# Patient Record
Sex: FEMALE | Race: WHITE
[De-identification: names, ages, dates, MRNs, and addresses within clinical notes are randomized per-mention and may not be internally consistent; named-entity substitution may affect disease eponyms.]

---

## 2020-02-23 ENCOUNTER — HEALTH MAINTENANCE LETTER (OUTPATIENT)
Age: 75
End: 2020-02-23

## 2020-12-06 ENCOUNTER — HEALTH MAINTENANCE LETTER (OUTPATIENT)
Age: 75
End: 2020-12-06

## 2021-04-11 ENCOUNTER — HEALTH MAINTENANCE LETTER (OUTPATIENT)
Age: 76
End: 2021-04-11

## 2021-09-26 ENCOUNTER — HEALTH MAINTENANCE LETTER (OUTPATIENT)
Age: 76
End: 2021-09-26

## 2022-05-07 ENCOUNTER — HEALTH MAINTENANCE LETTER (OUTPATIENT)
Age: 77
End: 2022-05-07

## 2023-04-23 ENCOUNTER — HEALTH MAINTENANCE LETTER (OUTPATIENT)
Age: 78
End: 2023-04-23

## 2023-06-02 ENCOUNTER — HEALTH MAINTENANCE LETTER (OUTPATIENT)
Age: 78
End: 2023-06-02

## 2025-06-14 ENCOUNTER — OFFICE VISIT (OUTPATIENT)
Dept: URGENT CARE | Facility: URGENT CARE | Age: 80
End: 2025-06-14
Payer: MEDICARE

## 2025-06-14 VITALS
SYSTOLIC BLOOD PRESSURE: 125 MMHG | RESPIRATION RATE: 16 BRPM | BODY MASS INDEX: 24.33 KG/M2 | DIASTOLIC BLOOD PRESSURE: 78 MMHG | OXYGEN SATURATION: 98 % | HEIGHT: 64 IN | WEIGHT: 142.5 LBS | TEMPERATURE: 97.6 F | HEART RATE: 92 BPM

## 2025-06-14 DIAGNOSIS — R09.81 NASAL CONGESTION: Primary | ICD-10-CM

## 2025-06-14 PROCEDURE — 3074F SYST BP LT 130 MM HG: CPT | Performed by: PHYSICIAN ASSISTANT

## 2025-06-14 PROCEDURE — 99203 OFFICE O/P NEW LOW 30 MIN: CPT | Performed by: PHYSICIAN ASSISTANT

## 2025-06-14 PROCEDURE — 3078F DIAST BP <80 MM HG: CPT | Performed by: PHYSICIAN ASSISTANT

## 2025-06-14 ASSESSMENT — ENCOUNTER SYMPTOMS
NECK PAIN: 0
JOINT SWELLING: 0
LIGHT-HEADEDNESS: 0
WOUND: 0
MUSCULOSKELETAL NEGATIVE: 1
FEVER: 0
SORE THROAT: 0
WEAKNESS: 0
EYES NEGATIVE: 1
ARTHRALGIAS: 0
MYALGIAS: 0
ALLERGIC/IMMUNOLOGIC NEGATIVE: 1
HEADACHES: 0
CARDIOVASCULAR NEGATIVE: 1
SINUS PRESSURE: 1
VOMITING: 0
DIZZINESS: 0
SHORTNESS OF BREATH: 0
PALPITATIONS: 0
RESPIRATORY NEGATIVE: 1
NAUSEA: 0
DIARRHEA: 0
BACK PAIN: 0
COUGH: 0
ENDOCRINE NEGATIVE: 1
NECK STIFFNESS: 0
RHINORRHEA: 0
CHILLS: 0

## 2025-06-14 NOTE — PROGRESS NOTES
Urgent Care Clinic Visit    Chief Complaint   Patient presents with    Urgent Care    Nasal Congestion     Patient reports she recently drove from [place] the past couple of days has been having nasal congestion, sinus pressure, and ear , states she is being treated for stage 3 kidney.                6/14/2025     1:46 PM   Additional Questions   Roomed by EMELINA Kaiser   Accompanied by Self         6/14/2025     1:46 PM   Patient Reported Additional Medications   Patient reports taking the following new medications OXYCODONE HYDROCHLORIDE 5 MG TABS,   clopidogrel (PLAVIX) 75 MG tablet,   amitriptyline (ELAVIL) 10 MG tablet,   busPIRone (BUSPAR) 5 MG tablet,   cyanocobalamin (VITAMIN B-12) 1000 MCG/ML injection,folic acid (FOLVITE) 1 MG tablet,   methocarbamol (ROBAXIN) 500 MG tablet,senna-docusate sodium (SENOKOT-S) 8.6-50 MG tablet,OZEMPIC 0.25 OR 0.5MG/[id number] 3ML

## 2025-06-14 NOTE — PROGRESS NOTES
"Chief Complaint:     Chief Complaint   Patient presents with    Urgent Care    Nasal Congestion     Patient reports she recently drove from [place] the past couple of days has been having nasal congestion, sinus pressure, and ear , states she is being treated for stage 3 kidney.        Results for orders placed or performed in visit on 08/18/11   TSH     Status: Abnormal   Result Value Ref Range    TSH 6.54 (H) 0.4 - 5.0 mU/L   Lipid panel reflex to direct LDL     Status: Abnormal   Result Value Ref Range    Cholesterol 201 (H) 0 - 200 mg/dL    Triglycerides 325 (H) 0 - 150 mg/dL    HDL Cholesterol 46 (L) 50 - 110 mg/dL    LDL Cholesterol Calculated 91 0 - 129 mg/dL    VLDL-Cholesterol 65 (H) 0 - 30 mg/dL    Cholesterol/HDL Ratio 4.4 0.0 - 5.0   Hemoglobin A1c     Status: None   Result Value Ref Range    Hemoglobin A1C 5.4 4.3 - 6.0 %   Comprehensive metabolic panel     Status: Abnormal   Result Value Ref Range    Sodium 139 133 - 144 mmol/L    Potassium 4.2 3.4 - 5.3 mmol/L    Chloride 104 94 - 109 mmol/L    Carbon Dioxide 24 20 - 32 mmol/L    Anion Gap 12 6 - 17 mmol/L    Glucose 137 (H) 60 - 99 mg/dL    Urea Nitrogen 21 7 - 30 mg/dL    Creatinine 1.05 (H) 0.52 - 1.04 mg/dL    GFR Estimate 52 (L) >60 mL/min/1.7m2    GFR Estimate If Black 63 >60 mL/min/1.7m2    Calcium 9.5 8.5 - 10.4 mg/dL    Bilirubin Total 0.4 0.2 - 1.3 mg/dL    Albumin 4.4 3.3 - 4.9 g/dL    Protein Total 7.8 6.8 - 8.8 g/dL    Alkaline Phosphatase 66 40 - 150 U/L    ALT 29 0 - 50 U/L    AST 34 0 - 45 U/L       Medical Decision Making:    Vital signs reviewed by Yayo Onofre PA-C  /78   Pulse 92   Temp 97.6  F (36.4  C) (Oral)   Resp 16   Ht 1.626 m (5' 4\")   Wt 64.6 kg (142 lb 8 oz)   SpO2 98%   BMI 24.46 kg/m      Differential Diagnosis:  URI Adult/Peds:  Sinusitis and Viral upper respiratory illness        ASSESSMENT    No diagnosis found.    PLAN    Patient is in no acute distress.    Temp is 97.6 in clinic today, lung " "sounds were clear, and O2 sats at 98% on RA.    Rest, Push fluids, vaporizer, elevation of head of bed.  Ibuprofen and or Tylenol for any fever or body aches.  If symptoms worsen, recheck immediately otherwise follow up with your PCP in 1 week if symptoms are not improving.  Worrisome symptoms discussed with instructions to go to the ED.  Patient verbalized understanding and agreed with this plan.    Labs:    Results for orders placed or performed in visit on 08/18/11   TSH     Status: Abnormal   Result Value Ref Range    TSH 6.54 (H) 0.4 - 5.0 mU/L   Lipid panel reflex to direct LDL     Status: Abnormal   Result Value Ref Range    Cholesterol 201 (H) 0 - 200 mg/dL    Triglycerides 325 (H) 0 - 150 mg/dL    HDL Cholesterol 46 (L) 50 - 110 mg/dL    LDL Cholesterol Calculated 91 0 - 129 mg/dL    VLDL-Cholesterol 65 (H) 0 - 30 mg/dL    Cholesterol/HDL Ratio 4.4 0.0 - 5.0   Hemoglobin A1c     Status: None   Result Value Ref Range    Hemoglobin A1C 5.4 4.3 - 6.0 %   Comprehensive metabolic panel     Status: Abnormal   Result Value Ref Range    Sodium 139 133 - 144 mmol/L    Potassium 4.2 3.4 - 5.3 mmol/L    Chloride 104 94 - 109 mmol/L    Carbon Dioxide 24 20 - 32 mmol/L    Anion Gap 12 6 - 17 mmol/L    Glucose 137 (H) 60 - 99 mg/dL    Urea Nitrogen 21 7 - 30 mg/dL    Creatinine 1.05 (H) 0.52 - 1.04 mg/dL    GFR Estimate 52 (L) >60 mL/min/1.7m2    GFR Estimate If Black 63 >60 mL/min/1.7m2    Calcium 9.5 8.5 - 10.4 mg/dL    Bilirubin Total 0.4 0.2 - 1.3 mg/dL    Albumin 4.4 3.3 - 4.9 g/dL    Protein Total 7.8 6.8 - 8.8 g/dL    Alkaline Phosphatase 66 40 - 150 U/L    ALT 29 0 - 50 U/L    AST 34 0 - 45 U/L        Vital signs reviewed by Yayo Onofre PA-C  /78   Pulse 92   Temp 97.6  F (36.4  C) (Oral)   Resp 16   Ht 1.626 m (5' 4\")   Wt 64.6 kg (142 lb 8 oz)   SpO2 98%   BMI 24.46 kg/m      Current Meds      Current Outpatient Medications:     atorvastatin (LIPITOR) 20 MG tablet, Take 1 tablet by mouth " daily., Disp: 90 tablet, Rfl: 1    escitalopram (LEXAPRO) 20 MG tablet, Take  by mouth daily. To take 2 tabs daily, Disp: 180 tablet, Rfl: 1    fenofibrate (TRICOR) 145 MG tablet, Take 1 tablet by mouth daily., Disp: 90 tablet, Rfl: 1    hydrochlorothiazide (HYDRODIURIL) 25 MG tablet, Take 1 tablet by mouth daily., Disp: 90 tablet, Rfl: PRN    levothyroxine (NTHROID, LEVOTHROID) 100 MCG tablet, Take 1 tablet by mouth daily., Disp: 90 tablet, Rfl: 3    escitalopram (LEXAPRO) 20 MG tablet, Take  by mouth daily. To take 2 tabs daily (Patient not taking: Reported on 6/14/2025), Disp: 180 tablet, Rfl: 0    levocetirizine (XYZAL) 5 MG tablet, Take 1 tablet by mouth daily as needed for allergies. (Patient not taking: Reported on 6/14/2025), Disp: 90 tablet, Rfl: PRN    levothyroxine (SYNTHROID, LEVOTHROID) 88 MCG tablet, Take 1 tablet by mouth daily. (Patient not taking: Reported on 6/14/2025), Disp: 90 tablet, Rfl: 3    VERAMYST 27.5 MCG/SPRAY NA SUSP, use one spray in each nostril everyday as needed   Appointment needed for further refills. (Patient not taking: Reported on 6/14/2025), Disp: 1 Bottle, Rfl: 0      Respiratory History    occasional episodes of bronchitis      SUBJECTIVE    HPI: Destinee Motley is an 79 year old female who presents with ear pressure, facial pain/pressure, and nasal congestion.  Symptoms began 2  days ago and has unchanged.  There is no shortness of breath, wheezing, and chest pain.  Patient is eating and drinking well.  No fever, nausea, vomiting, or diarrhea.    Patient denies any recent travel or exposure to known COVID positive tested individual.      Patient is new to [facility].      ROS:     Review of Systems   Constitutional:  Negative for chills and fever.   HENT:  Positive for congestion and sinus pressure. Negative for ear pain, rhinorrhea and sore throat.         Ear pressure   Eyes: Negative.    Respiratory: Negative.  Negative for cough and shortness of breath.     Cardiovascular: Negative.  Negative for chest pain and palpitations.   Gastrointestinal:  Negative for diarrhea, nausea and vomiting.   Endocrine: Negative.    Genitourinary: Negative.    Musculoskeletal: Negative.  Negative for arthralgias, back pain, joint swelling, myalgias, neck pain and neck stiffness.   Skin: Negative.  Negative for rash and wound.   Allergic/Immunologic: Negative.  Negative for immunocompromised state.   Neurological:  Negative for dizziness, weakness, light-headedness and headaches.         Family History   Family History   Problem Relation Age of Onset    Heart Disease Mother         CHF    Arthritis Mother     Cancer Father         bladder    C.A.D. Father     Genitourinary Problems Father     Thyroid Disease Father     Heart Disease Father     Diabetes Maternal Grandmother     Arthritis Maternal Grandfather     Heart Disease Paternal Grandmother     Cancer Paternal Grandmother         stomach    Thyroid Disease Paternal Grandfather     Neurologic Disorder Paternal Grandfather         Problem history  Patient Active Problem List   Diagnosis    Hypertensive vascular disease    Mild major depression (H)    Seasonal allergic rhinitis    Hypothyroidism    Hyperlipidemia with target LDL less than 100    Hypertriglyceridemia        Allergies  Allergies   Allergen Reactions    Seasonal Allergies         Social History  Social History     Socioeconomic History    Marital status:      Spouse name: Not on file    Number of children: Not on file    Years of education: Not on file    Highest education level: Not on file   Occupational History    Not on file   Tobacco Use    Smoking status: Every Day     Current packs/day: 1.00     Average packs/day: 1 pack/day for 30.0 years (30.0 ttl pk-yrs)     Types: Cigarettes    Smokeless tobacco: Never   Vaping Use    Vaping status: Never Used   Substance and Sexual Activity    Alcohol use: No    Drug use: No    Sexual activity: Yes     Partners: Male  "  Other Topics Concern    Parent/sibling w/ CABG, MI or angioplasty before 65F 55M? Not Asked   Social History Narrative    Not on file     Social Drivers of Health     Financial Resource Strain: Not on file   Food Insecurity: No Food Insecurity (10/25/2023)    Received from [facility] Vital Sign     Worried About Running Out of Food in the Last Year: Never true     Ran Out of Food in the Last Year: Never true   Transportation Needs: Not on file   Physical Activity: Not on file   Stress: Not on file   Social Connections: Not on file   Interpersonal Safety: Not on file   Housing Stability: Not on file        OBJECTIVE     Vital signs reviewed by Yayo Onofre PA-C  /78   Pulse 92   Temp 97.6  F (36.4  C) (Oral)   Resp 16   Ht 1.626 m (5' 4\")   Wt 64.6 kg (142 lb 8 oz)   SpO2 98%   BMI 24.46 kg/m       Physical Exam  Vitals and nursing note reviewed.   Constitutional:       General: She is not in acute distress.     Appearance: She is well-developed. She is not ill-appearing, toxic-appearing or diaphoretic.   HENT:      Head: Normocephalic and atraumatic.      Right Ear: Hearing, tympanic membrane, ear canal and external ear normal. Tympanic membrane is not perforated, erythematous, retracted or bulging.      Left Ear: Hearing, tympanic membrane, ear canal and external ear normal. Tympanic membrane is not perforated, erythematous, retracted or bulging.      Nose: Congestion present. No mucosal edema or rhinorrhea.      Right Sinus: No maxillary sinus tenderness or frontal sinus tenderness.      Left Sinus: No maxillary sinus tenderness or frontal sinus tenderness.      Mouth/Throat:      Pharynx: No pharyngeal swelling, oropharyngeal exudate, posterior oropharyngeal erythema or uvula swelling.      Tonsils: No tonsillar exudate or tonsillar abscesses. 0 on the right. 0 on the left.   Eyes:      General:         Right eye: No discharge.         Left eye: No " discharge.      Pupils: Pupils are equal, round, and reactive to light.   Cardiovascular:      Rate and Rhythm: Normal rate and regular rhythm.      Heart sounds: Normal heart sounds. No murmur heard.     No friction rub. No gallop.   Pulmonary:      Effort: Pulmonary effort is normal. No respiratory distress.      Breath sounds: Normal breath sounds. No decreased breath sounds, wheezing, rhonchi or rales.   Chest:      Chest wall: No tenderness.   Abdominal:      General: Bowel sounds are normal. There is no distension.      Palpations: Abdomen is soft. There is no mass.      Tenderness: There is no abdominal tenderness. There is no guarding.   Musculoskeletal:      Cervical back: Normal range of motion and neck supple.   Lymphadenopathy:      Head:      Right side of head: No submental, submandibular, tonsillar, preauricular or posterior auricular adenopathy.      Left side of head: No submental, submandibular, tonsillar, preauricular or posterior auricular adenopathy.      Cervical: No cervical adenopathy.      Right cervical: No superficial or posterior cervical adenopathy.     Left cervical: No superficial or posterior cervical adenopathy.   Skin:     General: Skin is warm and dry.      Findings: No rash.   Neurological:      Mental Status: She is alert and oriented to person, place, and time.      Cranial Nerves: No cranial nerve deficit.      Deep Tendon Reflexes: Reflexes are normal and symmetric.   Psychiatric:         Behavior: Behavior normal. Behavior is cooperative.         Thought Content: Thought content normal.         Judgment: Judgment normal.           Yayo Onofre PA-C  6/14/2025, 1:59 PM

## 2025-06-22 ENCOUNTER — HEALTH MAINTENANCE LETTER (OUTPATIENT)
Age: 80
End: 2025-06-22